# Patient Record
Sex: MALE | Race: WHITE | NOT HISPANIC OR LATINO | Employment: OTHER | URBAN - METROPOLITAN AREA
[De-identification: names, ages, dates, MRNs, and addresses within clinical notes are randomized per-mention and may not be internally consistent; named-entity substitution may affect disease eponyms.]

---

## 2024-01-24 ENCOUNTER — ANCILLARY PROCEDURE (OUTPATIENT)
Dept: GENERAL RADIOLOGY | Facility: CLINIC | Age: 74
End: 2024-01-24
Attending: FAMILY MEDICINE
Payer: COMMERCIAL

## 2024-01-24 ENCOUNTER — OFFICE VISIT (OUTPATIENT)
Dept: URGENT CARE | Facility: URGENT CARE | Age: 74
End: 2024-01-24
Payer: COMMERCIAL

## 2024-01-24 VITALS
SYSTOLIC BLOOD PRESSURE: 128 MMHG | TEMPERATURE: 98.6 F | DIASTOLIC BLOOD PRESSURE: 72 MMHG | HEART RATE: 63 BPM | OXYGEN SATURATION: 96 % | RESPIRATION RATE: 16 BRPM

## 2024-01-24 DIAGNOSIS — R05.9 COUGH, UNSPECIFIED TYPE: Primary | ICD-10-CM

## 2024-01-24 DIAGNOSIS — R04.2 BLOOD-TINGED SPUTUM: ICD-10-CM

## 2024-01-24 DIAGNOSIS — R93.89 ABNORMAL CXR: ICD-10-CM

## 2024-01-24 DIAGNOSIS — R07.81 RIB PAIN ON RIGHT SIDE: ICD-10-CM

## 2024-01-24 PROCEDURE — 71046 X-RAY EXAM CHEST 2 VIEWS: CPT | Mod: TC | Performed by: RADIOLOGY

## 2024-01-24 PROCEDURE — 99204 OFFICE O/P NEW MOD 45 MIN: CPT | Performed by: FAMILY MEDICINE

## 2024-01-24 PROCEDURE — 71101 X-RAY EXAM UNILAT RIBS/CHEST: CPT | Mod: TC | Performed by: RADIOLOGY

## 2025-03-03 NOTE — PROGRESS NOTES
Pt requesting to change med back to zoloft instead of prozac.     Pt had zoloft sent to [facility] pharmacy on 2/19/25.    SUBJECTIVE:  Chief Complaint   Patient presents with    Rib Pain     Today, saw chiropractor and suggested right rib xray-saw rib out of place, would like it done before seeing chiro again   .ident who presents with a chief complaint of  right rib pain, no injury worse with palpation and laying down.  Symptoms began 2 day(s) ago , are moderate andstill present.  Context:Injury: no  Cough for 6 months with pink phlegm few days ago  Also SOB  Painful respiration    No past medical history on file.    No past surgical history on file.    No family history on file.    Social History     Tobacco Use    Smoking status: Never    Smokeless tobacco: Never   Substance Use Topics    Alcohol use: Not on file        ROS:CONSTITUTIONAL:NEGATIVE for fever, chills, change in weight  INTEGUMENTARY/SKIN: NEGATIVE for worrisome rashes, moles or lesions      EXAM: /72   Pulse 63   Temp 98.6  F (37  C)   Resp 16   SpO2 96%   Exam:rt lateral rib pain with palpation  GENERAL APPEARANCE: healthy, alert and no distress  CHEST: clear rhonchi RLL  SKIN: no suspicious lesions or rashes    X-RAY   IMPRESSION: Heart size is normal. Patchy opacity in the lower right  lung is noted, concerning for early pneumonia. Minimal opacity in the  left lower lung, difficult to distinguish atelectasis from pneumonia.  There is elevation of the right hemidiaphragm. No definite pleural  effusion and no pneumothorax.    ASSESSMENT:     ICD-10-CM    1. Cough, unspecified type  R05.9 XR Chest 2 Views      2. Blood-tinged sputum  R04.2 XR Chest 2 Views      3. Abnormal CXR  R93.89       4. Rib pain on right side  R07.81 XR Ribs & Chest Right G/E 3 Views        Pt will go through ED for cont /hernandez